# Patient Record
Sex: MALE | Race: WHITE | NOT HISPANIC OR LATINO | Employment: OTHER | URBAN - METROPOLITAN AREA
[De-identification: names, ages, dates, MRNs, and addresses within clinical notes are randomized per-mention and may not be internally consistent; named-entity substitution may affect disease eponyms.]

---

## 2017-05-20 PROBLEM — G47.33 OSA ON CPAP: Status: ACTIVE | Noted: 2017-05-20

## 2017-05-20 PROBLEM — E66.09 NON MORBID OBESITY DUE TO EXCESS CALORIES: Status: ACTIVE | Noted: 2017-05-20

## 2017-05-20 PROBLEM — N20.1 LEFT URETERAL CALCULUS: Status: ACTIVE | Noted: 2017-05-20

## 2017-05-20 PROBLEM — R00.1 BRADYCARDIA: Status: ACTIVE | Noted: 2017-05-20

## 2017-05-20 PROBLEM — E78.5 DYSLIPIDEMIA: Status: ACTIVE | Noted: 2017-05-20

## 2017-05-21 PROBLEM — N13.2 HYDRONEPHROSIS WITH OBSTRUCTING CALCULUS: Status: ACTIVE | Noted: 2017-05-21

## 2017-05-21 PROBLEM — R00.1 BRADYCARDIA, SINUS: Status: ACTIVE | Noted: 2017-05-21

## 2017-05-23 ENCOUNTER — TELEPHONE (OUTPATIENT)
Dept: CARDIOLOGY | Facility: CLINIC | Age: 72
End: 2017-05-23

## 2017-05-23 NOTE — TELEPHONE ENCOUNTER
----- Message from Rama Velez sent at 5/23/2017 12:01 PM CDT -----  Patient being discharged from [facility] and needs a 2 week follow up. Please call patient back at [phone] cell number is the best contact.     Thank you

## 2017-07-13 PROBLEM — R94.39 ABNORMAL STRESS TEST: Status: ACTIVE | Noted: 2017-07-13

## 2018-08-27 ENCOUNTER — TELEPHONE (OUTPATIENT)
Dept: RHEUMATOLOGY | Facility: CLINIC | Age: 73
End: 2018-08-27

## 2018-08-27 NOTE — TELEPHONE ENCOUNTER
----- Message from Patria Nesbitt sent at 8/27/2018 11:18 AM CDT -----  Contact: wife, Meliza Benitez  Patient needs to reschedule appointment for 08/28/18. Patient not able to make the appointment and would like to get in as soon as possible. Please call wife at [phone] or [phone]    Rescheduled for 8-30-18 at 1. Patient confirms. CG

## 2018-08-30 ENCOUNTER — LAB VISIT (OUTPATIENT)
Dept: LAB | Facility: HOSPITAL | Age: 73
End: 2018-08-30
Attending: INTERNAL MEDICINE
Payer: MEDICARE

## 2018-08-30 ENCOUNTER — INITIAL CONSULT (OUTPATIENT)
Dept: RHEUMATOLOGY | Facility: CLINIC | Age: 73
End: 2018-08-30
Payer: MEDICARE

## 2018-08-30 VITALS
BODY MASS INDEX: 32.42 KG/M2 | DIASTOLIC BLOOD PRESSURE: 78 MMHG | HEART RATE: 56 BPM | HEIGHT: 68 IN | WEIGHT: 213.94 LBS | SYSTOLIC BLOOD PRESSURE: 121 MMHG

## 2018-08-30 DIAGNOSIS — R53.83 MALAISE AND FATIGUE: ICD-10-CM

## 2018-08-30 DIAGNOSIS — R76.8 RHEUMATOID FACTOR POSITIVE: Primary | ICD-10-CM

## 2018-08-30 DIAGNOSIS — R76.8 RHEUMATOID FACTOR POSITIVE: ICD-10-CM

## 2018-08-30 DIAGNOSIS — R53.81 MALAISE AND FATIGUE: ICD-10-CM

## 2018-08-30 LAB
CCP AB SER IA-ACNC: <0.5 U/ML
CRP SERPL-MCNC: 1.1 MG/L
ERYTHROCYTE [SEDIMENTATION RATE] IN BLOOD BY WESTERGREN METHOD: 7 MM/HR

## 2018-08-30 PROCEDURE — 36415 COLL VENOUS BLD VENIPUNCTURE: CPT | Mod: PO

## 2018-08-30 PROCEDURE — 86140 C-REACTIVE PROTEIN: CPT

## 2018-08-30 PROCEDURE — 99999 PR PBB SHADOW E&M-EST. PATIENT-LVL III: CPT | Mod: PBBFAC,,, | Performed by: INTERNAL MEDICINE

## 2018-08-30 PROCEDURE — 80074 ACUTE HEPATITIS PANEL: CPT

## 2018-08-30 PROCEDURE — 99205 OFFICE O/P NEW HI 60 MIN: CPT | Mod: S$PBB,,, | Performed by: INTERNAL MEDICINE

## 2018-08-30 PROCEDURE — 86200 CCP ANTIBODY: CPT

## 2018-08-30 PROCEDURE — 99213 OFFICE O/P EST LOW 20 MIN: CPT | Mod: PBBFAC,PO | Performed by: INTERNAL MEDICINE

## 2018-08-30 PROCEDURE — 85651 RBC SED RATE NONAUTOMATED: CPT | Mod: PO

## 2018-08-30 RX ORDER — PREDNISOLONE ACETATE 10 MG/ML
SUSPENSION/ DROPS OPHTHALMIC
COMMUNITY
Start: 2018-08-22 | End: 2018-09-19

## 2018-08-30 RX ORDER — AZELASTINE HYDROCHLORIDE 0.5 MG/ML
SOLUTION/ DROPS OPHTHALMIC
COMMUNITY
Start: 2018-08-22 | End: 2018-09-19

## 2018-08-30 ASSESSMENT — ROUTINE ASSESSMENT OF PATIENT INDEX DATA (RAPID3)
PSYCHOLOGICAL DISTRESS SCORE: 0
TOTAL RAPID3 SCORE: .94
PATIENT GLOBAL ASSESSMENT SCORE: 1.5
MDHAQ FUNCTION SCORE: .1
PAIN SCORE: 1

## 2018-08-30 NOTE — PROGRESS NOTES
Subjective:          Chief Complaint: Bryan Elliott is a 73 y.o. male who had concerns including Rheumatoid Arthritis (referral from Dr. Cecil Stern).    HPI:  Patient is a 73-year-old gentleman is being referred by Dr. Stern for polyarthritis positive rheumatoid factor slightly elevated inflammatory marker.  He has t a history of hyperlipidemia, bradycardia hypertension add no history of renal disease says last GFR was greater than 60.  He follows with cardiology DrMadai.  Also some he has undergone left heart catheterization with minimal to no coronary artery disease and normal ejection fraction 24 hr Holter average heart rate was 65.  Did see Dr. Tyson with scleritis started this week. No recall that his eyes are dry but       Component      Latest Ref Rng & Units 4/4/2018   Sed Rate      0 - 19 mm/Hr 21 (H)   CHRISTO Screen      None Detected None Detected   Rheumatoid Factor      0.0 - 15.0 IU/mL 26.5 (H)     REVIEW OF SYSTEMS:    Review of Systems   Constitutional: Negative for fever, malaise/fatigue and weight loss.   HENT: Negative for sore throat.    Eyes: Negative for double vision, photophobia and redness.   Respiratory: Negative for cough, shortness of breath and wheezing.    Cardiovascular: Negative for chest pain, palpitations and orthopnea.   Gastrointestinal: Negative for abdominal pain, constipation and diarrhea.   Genitourinary: Negative for dysuria, hematuria and urgency.   Musculoskeletal: Negative for back pain, joint pain and myalgias.   Skin: Negative for rash.   Neurological: Negative for dizziness, tingling, focal weakness and headaches.   Endo/Heme/Allergies: Does not bruise/bleed easily.   Psychiatric/Behavioral: Negative for depression, hallucinations and suicidal ideas.               Objective:            Past Medical History:   Diagnosis Date    Bradycardia     Hyperlipidemia     Kidney stones     GISSEL on CPAP     uses cpap    Respiratory distress     had trouble getting oxygen level  up.     Family History   Problem Relation Age of Onset    Cancer Mother     Multiple myeloma Mother     Heart disease Father     Arthritis Brother     No Known Problems Maternal Grandfather     Stroke Paternal Grandfather      Social History     Tobacco Use    Smoking status: Never Smoker    Smokeless tobacco: Never Used   Substance Use Topics    Alcohol use: No    Drug use: No         Current Outpatient Medications on File Prior to Visit   Medication Sig Dispense Refill    aspirin (ECOTRIN) 81 MG EC tablet Take 81 mg by mouth once daily.      atorvastatin (LIPITOR) 10 MG tablet Take 1 tablet (10 mg total) by mouth once daily. 90 tablet 3    coenzyme Q10 (CO Q-10) 10 mg capsule Take 10 mg by mouth once daily.      cyanocobalamin (VITAMIN B-12) 1000 MCG tablet Take 100 mcg by mouth once daily.      ERGOCALCIFEROL, VITAMIN D2, (VITAMIN D ORAL) Take 1 capsule by mouth once daily.      tamsulosin (FLOMAX) 0.4 mg Cp24 Take 0.4 mg by mouth once daily.       No current facility-administered medications on file prior to visit.        There were no vitals filed for this visit.    Physical Exam:    Physical Exam   Constitutional: He appears well-developed and well-nourished.   HENT:   Head: Atraumatic.   Nose: No nasal deformity.   Mouth/Throat: No oral lesions. No dental caries.   Eyes: Pupils are equal, round, and reactive to light. Right conjunctiva is not injected. Left conjunctiva is not injected.   Neck: No JVD present.   Cardiovascular: Normal rate and regular rhythm.   Pulmonary/Chest: Effort normal and breath sounds normal.   Abdominal: Soft. Bowel sounds are normal. There is no hepatosplenomegaly.   Musculoskeletal:        Right shoulder: He exhibits normal range of motion, no tenderness, no effusion and no crepitus.        Left shoulder: He exhibits normal range of motion, no tenderness, no effusion and no crepitus.        Right elbow: He exhibits normal range of motion and no effusion. No  tenderness found.        Left elbow: He exhibits normal range of motion and no effusion. No tenderness found.        Right wrist: He exhibits normal range of motion, no tenderness and no swelling.        Left wrist: He exhibits normal range of motion, no tenderness and no swelling.        Right hip: He exhibits normal range of motion, no tenderness and no swelling.        Left hip: He exhibits normal range of motion, no tenderness and no swelling.        Right knee: He exhibits normal range of motion and no swelling. No tenderness found.        Left knee: He exhibits normal range of motion and no swelling. No tenderness found.        Right ankle: He exhibits normal range of motion and no swelling. No tenderness.        Left ankle: He exhibits normal range of motion and no swelling. No tenderness.   Lymphadenopathy:     He has no cervical adenopathy.   Neurological: He has normal strength.   Skin: Skin is warm, dry and intact.   Psychiatric: He has a normal mood and affect.             Assessment:       Encounter Diagnoses   Name Primary?    Rheumatoid factor positive Yes    Malaise and fatigue           Plan:        Rheumatoid factor positive  -     Hepatitis panel, acute; Future; Expected date: 08/30/2018  -     Sedimentation rate; Future; Expected date: 08/30/2018  -     C-reactive protein; Standing; Expected date: 08/30/2018  -     Cyclic citrul peptide antibody, IgG; Future; Expected date: 08/30/2018    Malaise and fatigue  -     Hepatitis panel, acute; Future; Expected date: 08/30/2018  -     Sedimentation rate; Future; Expected date: 08/30/2018  -     C-reactive protein; Standing; Expected date: 08/30/2018  -     Cyclic citrul peptide antibody, IgG; Future; Expected date: 08/30/2018    Very pleasant 72 y/o gentleman with +RF no clinical features of RA    -check serologies   -continue NSAIDs PRN    -fu PRN   -no overt synovitis / no joint pain.   Reviewed some exercises for piriformis syndrome  Follow-up if  symptoms worsen or fail to improve.          60min consultation with greater than 50% spent in counseling, chart review and coordination of care. All questions answered.  Thank you for allowing me to participate in the care of this very pleasant patient.

## 2018-08-30 NOTE — LETTER
August 30, 2018      Cecil Stern MD  [address] [address] [address]           [place] [facility] [address] [facility] [address] [place] [address]  Phone: [phone]  Fax: [phone]          Patient: Bryan Elliott   MR Number: [id number]   YOB: 1945   Date of Visit: 8/30/2018       Dear Dr. Cecil Stern:    Thank you for referring Bryan Elliott to me for evaluation. Attached you will find relevant portions of my assessment and plan of care.    If you have questions, please do not hesitate to call me. I look forward to following Bryan Elliott along with you.    Sincerely,    Tamiko Pichardo, DO    Enclosure  CC:  No Recipients    If you would like to receive this communication electronically, please contact [email or web address] or [phone] to request more information on [employer] access.    For providers and/or their staff who would like to refer a patient to [facility], please contact us through our one-stop-shop provider referral line, [facility], at [phone].    If you feel you have received this communication in error or would no longer like to receive these types of communications, please e-mail [email or web address]

## 2018-08-31 LAB
HAV IGM SERPL QL IA: NEGATIVE
HBV CORE IGM SERPL QL IA: NEGATIVE
HBV SURFACE AG SERPL QL IA: NEGATIVE
HCV AB SERPL QL IA: NEGATIVE

## 2018-09-13 ENCOUNTER — TELEPHONE (OUTPATIENT)
Dept: RHEUMATOLOGY | Facility: CLINIC | Age: 73
End: 2018-09-13

## 2018-09-13 NOTE — TELEPHONE ENCOUNTER
----- Message from Jess Amezquita sent at 9/13/2018  9:01 AM CDT -----  Type:  Test Results    Who Called:  Patient  Name of Test (Lab/Mammo/Etc):  lab  Date of Test:  8/30/18  Ordering Provider:  same  Where the test was performed:  [facility]  Best Call Back Number:  [phone]    Let patient know all labs were normal. DIXON

## 2018-11-06 PROBLEM — Z95.0 CARDIAC PACEMAKER IN SITU: Status: ACTIVE | Noted: 2018-11-06

## 2019-12-12 PROBLEM — R13.10 DYSPHAGIA, UNSPECIFIED: Status: ACTIVE | Noted: 2019-12-12

## 2020-10-14 PROBLEM — U07.1 PNEUMONIA DUE TO COVID-19 VIRUS: Status: ACTIVE | Noted: 2020-10-14

## 2020-10-14 PROBLEM — J12.82 PNEUMONIA DUE TO COVID-19 VIRUS: Status: ACTIVE | Noted: 2020-10-14

## 2020-10-17 PROBLEM — I48.91 ATRIAL FIBRILLATION WITH RAPID VENTRICULAR RESPONSE: Status: ACTIVE | Noted: 2020-10-17

## 2020-10-31 PROBLEM — K92.2 UGIB (UPPER GASTROINTESTINAL BLEED): Status: ACTIVE | Noted: 2020-10-31

## 2020-10-31 PROBLEM — D64.9 SYMPTOMATIC ANEMIA: Status: ACTIVE | Noted: 2020-10-31

## 2020-11-01 PROBLEM — K26.4 DUODENAL ULCER HEMORRHAGE: Status: ACTIVE | Noted: 2020-11-01

## 2020-11-10 ENCOUNTER — LAB VISIT (OUTPATIENT)
Dept: LAB | Facility: HOSPITAL | Age: 75
End: 2020-11-10
Attending: FAMILY MEDICINE
Payer: MEDICARE

## 2020-11-10 DIAGNOSIS — I10 ESSENTIAL HYPERTENSION, BENIGN: ICD-10-CM

## 2020-11-10 DIAGNOSIS — D64.9 ANEMIA, UNSPECIFIED: Primary | ICD-10-CM

## 2020-11-10 LAB
ANION GAP SERPL CALC-SCNC: 7 MMOL/L (ref 8–16)
BASOPHILS # BLD AUTO: 0.03 K/UL (ref 0–0.2)
BASOPHILS NFR BLD: 0.6 % (ref 0–1.9)
BUN SERPL-MCNC: 8 MG/DL (ref 8–23)
CALCIUM SERPL-MCNC: 8.7 MG/DL (ref 8.7–10.5)
CHLORIDE SERPL-SCNC: 107 MMOL/L (ref 95–110)
CO2 SERPL-SCNC: 27 MMOL/L (ref 23–29)
CREAT SERPL-MCNC: 1 MG/DL (ref 0.5–1.4)
DIFFERENTIAL METHOD: ABNORMAL
EOSINOPHIL # BLD AUTO: 0.2 K/UL (ref 0–0.5)
EOSINOPHIL NFR BLD: 4.8 % (ref 0–8)
ERYTHROCYTE [DISTWIDTH] IN BLOOD BY AUTOMATED COUNT: 16.2 % (ref 11.5–14.5)
EST. GFR  (AFRICAN AMERICAN): >60 ML/MIN/1.73 M^2
EST. GFR  (NON AFRICAN AMERICAN): >60 ML/MIN/1.73 M^2
GLUCOSE SERPL-MCNC: 104 MG/DL (ref 70–110)
HCT VFR BLD AUTO: 32.9 % (ref 40–54)
HGB BLD-MCNC: 10.2 G/DL (ref 14–18)
IMM GRANULOCYTES # BLD AUTO: 0.06 K/UL (ref 0–0.04)
IMM GRANULOCYTES NFR BLD AUTO: 1.3 % (ref 0–0.5)
LYMPHOCYTES # BLD AUTO: 1.3 K/UL (ref 1–4.8)
LYMPHOCYTES NFR BLD: 27.3 % (ref 18–48)
MCH RBC QN AUTO: 29.1 PG (ref 27–31)
MCHC RBC AUTO-ENTMCNC: 31 G/DL (ref 32–36)
MCV RBC AUTO: 94 FL (ref 82–98)
MONOCYTES # BLD AUTO: 0.5 K/UL (ref 0.3–1)
MONOCYTES NFR BLD: 9.7 % (ref 4–15)
NEUTROPHILS # BLD AUTO: 2.6 K/UL (ref 1.8–7.7)
NEUTROPHILS NFR BLD: 56.3 % (ref 38–73)
NRBC BLD-RTO: 0 /100 WBC
PLATELET # BLD AUTO: 351 K/UL (ref 150–350)
PMV BLD AUTO: 9.7 FL (ref 9.2–12.9)
POTASSIUM SERPL-SCNC: 4.1 MMOL/L (ref 3.5–5.1)
RBC # BLD AUTO: 3.51 M/UL (ref 4.6–6.2)
SODIUM SERPL-SCNC: 141 MMOL/L (ref 136–145)
WBC # BLD AUTO: 4.62 K/UL (ref 3.9–12.7)

## 2020-11-10 PROCEDURE — 85025 COMPLETE CBC W/AUTO DIFF WBC: CPT

## 2020-11-10 PROCEDURE — 80048 BASIC METABOLIC PNL TOTAL CA: CPT

## 2020-11-23 PROBLEM — I48.91 ATRIAL FIBRILLATION WITH RAPID VENTRICULAR RESPONSE: Status: RESOLVED | Noted: 2020-10-17 | Resolved: 2020-11-23

## 2020-11-23 PROBLEM — I48.0 PAF (PAROXYSMAL ATRIAL FIBRILLATION): Status: ACTIVE | Noted: 2020-11-23

## 2021-05-04 ENCOUNTER — PATIENT MESSAGE (OUTPATIENT)
Dept: RESEARCH | Facility: HOSPITAL | Age: 76
End: 2021-05-04

## 2021-06-03 PROBLEM — K26.0 ACUTE DUODENAL ULCER WITH HEMORRHAGE: Status: ACTIVE | Noted: 2021-06-03

## 2022-04-29 PROBLEM — E66.811 CLASS 1 OBESITY DUE TO EXCESS CALORIES WITH SERIOUS COMORBIDITY IN ADULT: Status: ACTIVE | Noted: 2022-04-29

## 2022-04-29 PROBLEM — E78.5 DYSLIPIDEMIA: Status: RESOLVED | Noted: 2017-05-20 | Resolved: 2022-04-29

## 2022-04-29 PROBLEM — K21.9 GASTROESOPHAGEAL REFLUX DISEASE WITHOUT ESOPHAGITIS: Status: ACTIVE | Noted: 2022-04-29

## 2022-04-29 PROBLEM — Z87.19 HISTORY OF INGUINAL HERNIA REPAIR, BILATERAL: Status: ACTIVE | Noted: 2022-04-29

## 2022-04-29 PROBLEM — K26.0 ACUTE DUODENAL ULCER WITH HEMORRHAGE: Status: RESOLVED | Noted: 2021-06-03 | Resolved: 2022-04-29

## 2022-04-29 PROBLEM — Z87.19 HISTORY OF DUODENAL ULCER: Status: ACTIVE | Noted: 2022-04-29

## 2022-04-29 PROBLEM — Z98.890 HISTORY OF HEMORRHOIDECTOMY: Status: ACTIVE | Noted: 2022-04-29

## 2022-04-29 PROBLEM — Z86.0100 PERSONAL HISTORY OF COLONIC POLYPS: Status: ACTIVE | Noted: 2022-04-29

## 2022-04-29 PROBLEM — R94.39 ABNORMAL STRESS TEST: Status: RESOLVED | Noted: 2017-07-13 | Resolved: 2022-04-29

## 2022-04-29 PROBLEM — N13.2 HYDRONEPHROSIS WITH OBSTRUCTING CALCULUS: Status: RESOLVED | Noted: 2017-05-21 | Resolved: 2022-04-29

## 2022-04-29 PROBLEM — G47.33 OSA ON CPAP: Status: RESOLVED | Noted: 2017-05-20 | Resolved: 2022-04-29

## 2022-04-29 PROBLEM — Z98.890 HISTORY OF ARTHROSCOPIC SURGERY OF SHOULDER: Status: ACTIVE | Noted: 2022-04-29

## 2022-04-29 PROBLEM — Z98.890 HISTORY OF INGUINAL HERNIA REPAIR, BILATERAL: Status: ACTIVE | Noted: 2022-04-29

## 2022-04-29 PROBLEM — I48.0 PAF (PAROXYSMAL ATRIAL FIBRILLATION): Status: RESOLVED | Noted: 2020-11-23 | Resolved: 2022-04-29

## 2022-04-29 PROBLEM — R13.10 DYSPHAGIA, UNSPECIFIED: Status: RESOLVED | Noted: 2019-12-12 | Resolved: 2022-04-29

## 2022-04-29 PROBLEM — U07.1 PNEUMONIA DUE TO COVID-19 VIRUS: Status: RESOLVED | Noted: 2020-10-14 | Resolved: 2022-04-29

## 2022-04-29 PROBLEM — J12.82 PNEUMONIA DUE TO COVID-19 VIRUS: Status: RESOLVED | Noted: 2020-10-14 | Resolved: 2022-04-29

## 2022-04-29 PROBLEM — Z86.79 HISTORY OF ATRIAL FIBRILLATION: Status: ACTIVE | Noted: 2022-04-29

## 2022-04-29 PROBLEM — R41.3 SHORT-TERM MEMORY LOSS: Status: ACTIVE | Noted: 2022-04-29

## 2022-04-29 PROBLEM — K29.30 CHRONIC SUPERFICIAL GASTRITIS WITHOUT BLEEDING: Status: ACTIVE | Noted: 2022-04-29

## 2022-04-29 PROBLEM — Z95.0 PRESENCE OF CARDIAC PACEMAKER: Status: ACTIVE | Noted: 2022-04-29

## 2022-04-29 PROBLEM — E66.09 NON MORBID OBESITY DUE TO EXCESS CALORIES: Status: RESOLVED | Noted: 2017-05-20 | Resolved: 2022-04-29

## 2022-04-29 PROBLEM — K26.4 DUODENAL ULCER HEMORRHAGE: Status: RESOLVED | Noted: 2020-11-01 | Resolved: 2022-04-29

## 2022-04-29 PROBLEM — K92.2 UGIB (UPPER GASTROINTESTINAL BLEED): Status: RESOLVED | Noted: 2020-10-31 | Resolved: 2022-04-29

## 2022-04-29 PROBLEM — Z86.19 HISTORY OF SHINGLES: Status: ACTIVE | Noted: 2022-04-29

## 2022-04-29 PROBLEM — Z80.7 FAMILY HISTORY OF MULTIPLE MYELOMA: Status: ACTIVE | Noted: 2022-04-29

## 2022-04-29 PROBLEM — D64.9 SYMPTOMATIC ANEMIA: Status: RESOLVED | Noted: 2020-10-31 | Resolved: 2022-04-29

## 2022-04-29 PROBLEM — E55.9 VITAMIN D DEFICIENCY, UNSPECIFIED: Status: ACTIVE | Noted: 2022-04-29

## 2022-04-29 PROBLEM — Z87.01 HISTORY OF VIRAL PNEUMONIA: Status: ACTIVE | Noted: 2022-04-29

## 2022-04-29 PROBLEM — N20.1 LEFT URETERAL CALCULUS: Status: RESOLVED | Noted: 2017-05-20 | Resolved: 2022-04-29

## 2022-04-29 PROBLEM — Z86.010 PERSONAL HISTORY OF COLONIC POLYPS: Status: ACTIVE | Noted: 2022-04-29

## 2022-04-29 PROBLEM — Z82.49 FAMILY HISTORY OF EARLY CAD: Status: ACTIVE | Noted: 2022-04-29

## 2022-04-29 PROBLEM — G47.33 OSA ON CPAP: Status: ACTIVE | Noted: 2022-04-29

## 2022-04-29 PROBLEM — E66.09 CLASS 1 OBESITY DUE TO EXCESS CALORIES WITH SERIOUS COMORBIDITY IN ADULT: Status: ACTIVE | Noted: 2022-04-29

## 2022-04-29 PROBLEM — I71.40 ABDOMINAL AORTIC ANEURYSM (AAA) WITHOUT RUPTURE: Status: ACTIVE | Noted: 2022-04-29

## 2022-04-29 PROBLEM — N40.0 BPH (BENIGN PROSTATIC HYPERPLASIA): Status: ACTIVE | Noted: 2022-04-29

## 2022-04-29 PROBLEM — N20.0 NEPHROLITHIASIS: Status: ACTIVE | Noted: 2022-04-29

## 2022-07-11 ENCOUNTER — TELEPHONE (OUTPATIENT)
Dept: NEUROLOGY | Facility: CLINIC | Age: 77
End: 2022-07-11

## 2022-07-11 ENCOUNTER — LAB VISIT (OUTPATIENT)
Dept: LAB | Facility: HOSPITAL | Age: 77
End: 2022-07-11
Attending: NURSE PRACTITIONER
Payer: MEDICARE

## 2022-07-11 ENCOUNTER — OFFICE VISIT (OUTPATIENT)
Dept: NEUROLOGY | Facility: CLINIC | Age: 77
End: 2022-07-11
Payer: MEDICARE

## 2022-07-11 VITALS
WEIGHT: 217.94 LBS | HEART RATE: 60 BPM | HEIGHT: 68 IN | SYSTOLIC BLOOD PRESSURE: 110 MMHG | RESPIRATION RATE: 16 BRPM | DIASTOLIC BLOOD PRESSURE: 75 MMHG | BODY MASS INDEX: 33.03 KG/M2

## 2022-07-11 DIAGNOSIS — R41.3 MEMORY LOSS: Primary | ICD-10-CM

## 2022-07-11 DIAGNOSIS — R41.3 OTHER AMNESIA: ICD-10-CM

## 2022-07-11 DIAGNOSIS — Z87.01 HISTORY OF VIRAL PNEUMONIA: ICD-10-CM

## 2022-07-11 DIAGNOSIS — R41.3 SHORT-TERM MEMORY LOSS: ICD-10-CM

## 2022-07-11 DIAGNOSIS — Z95.0 CARDIAC PACEMAKER IN SITU: ICD-10-CM

## 2022-07-11 DIAGNOSIS — R41.3 MEMORY LOSS: ICD-10-CM

## 2022-07-11 DIAGNOSIS — G47.33 OSA ON CPAP: ICD-10-CM

## 2022-07-11 DIAGNOSIS — Z95.0 PRESENCE OF CARDIAC PACEMAKER: ICD-10-CM

## 2022-07-11 DIAGNOSIS — Z86.79 HISTORY OF ATRIAL FIBRILLATION: ICD-10-CM

## 2022-07-11 LAB
ALBUMIN SERPL BCP-MCNC: 3.7 G/DL (ref 3.5–5.2)
ALP SERPL-CCNC: 61 U/L (ref 55–135)
ALT SERPL W/O P-5'-P-CCNC: 14 U/L (ref 10–44)
ANION GAP SERPL CALC-SCNC: 8 MMOL/L (ref 8–16)
AST SERPL-CCNC: 17 U/L (ref 10–40)
BASOPHILS # BLD AUTO: 0.04 K/UL (ref 0–0.2)
BASOPHILS NFR BLD: 0.9 % (ref 0–1.9)
BILIRUB SERPL-MCNC: 1.5 MG/DL (ref 0.1–1)
BUN SERPL-MCNC: 17 MG/DL (ref 8–23)
CALCIUM SERPL-MCNC: 9.2 MG/DL (ref 8.7–10.5)
CHLORIDE SERPL-SCNC: 106 MMOL/L (ref 95–110)
CO2 SERPL-SCNC: 25 MMOL/L (ref 23–29)
CREAT SERPL-MCNC: 1 MG/DL (ref 0.5–1.4)
DIFFERENTIAL METHOD: ABNORMAL
EOSINOPHIL # BLD AUTO: 0.1 K/UL (ref 0–0.5)
EOSINOPHIL NFR BLD: 2.7 % (ref 0–8)
ERYTHROCYTE [DISTWIDTH] IN BLOOD BY AUTOMATED COUNT: 13.5 % (ref 11.5–14.5)
EST. GFR  (AFRICAN AMERICAN): >60 ML/MIN/1.73 M^2
EST. GFR  (NON AFRICAN AMERICAN): >60 ML/MIN/1.73 M^2
FOLATE SERPL-MCNC: 9.5 NG/ML (ref 4–24)
GLUCOSE SERPL-MCNC: 87 MG/DL (ref 70–110)
HCT VFR BLD AUTO: 46.3 % (ref 40–54)
HGB BLD-MCNC: 14.9 G/DL (ref 14–18)
IMM GRANULOCYTES # BLD AUTO: 0.03 K/UL (ref 0–0.04)
IMM GRANULOCYTES NFR BLD AUTO: 0.7 % (ref 0–0.5)
LYMPHOCYTES # BLD AUTO: 1.4 K/UL (ref 1–4.8)
LYMPHOCYTES NFR BLD: 30.4 % (ref 18–48)
MCH RBC QN AUTO: 28.5 PG (ref 27–31)
MCHC RBC AUTO-ENTMCNC: 32.2 G/DL (ref 32–36)
MCV RBC AUTO: 89 FL (ref 82–98)
MONOCYTES # BLD AUTO: 0.5 K/UL (ref 0.3–1)
MONOCYTES NFR BLD: 11.6 % (ref 4–15)
NEUTROPHILS # BLD AUTO: 2.4 K/UL (ref 1.8–7.7)
NEUTROPHILS NFR BLD: 53.7 % (ref 38–73)
NRBC BLD-RTO: 0 /100 WBC
PLATELET # BLD AUTO: 209 K/UL (ref 150–450)
PMV BLD AUTO: 10.3 FL (ref 9.2–12.9)
POTASSIUM SERPL-SCNC: 4.1 MMOL/L (ref 3.5–5.1)
PROT SERPL-MCNC: 7.1 G/DL (ref 6–8.4)
RBC # BLD AUTO: 5.23 M/UL (ref 4.6–6.2)
SODIUM SERPL-SCNC: 139 MMOL/L (ref 136–145)
WBC # BLD AUTO: 4.48 K/UL (ref 3.9–12.7)

## 2022-07-11 PROCEDURE — 99215 OFFICE O/P EST HI 40 MIN: CPT | Mod: PBBFAC,PO | Performed by: NURSE PRACTITIONER

## 2022-07-11 PROCEDURE — 99999 PR PBB SHADOW E&M-EST. PATIENT-LVL V: CPT | Mod: PBBFAC,,, | Performed by: NURSE PRACTITIONER

## 2022-07-11 PROCEDURE — 82746 ASSAY OF FOLIC ACID SERUM: CPT | Performed by: NURSE PRACTITIONER

## 2022-07-11 PROCEDURE — 84425 ASSAY OF VITAMIN B-1: CPT | Performed by: NURSE PRACTITIONER

## 2022-07-11 PROCEDURE — 85025 COMPLETE CBC W/AUTO DIFF WBC: CPT | Performed by: NURSE PRACTITIONER

## 2022-07-11 PROCEDURE — 99205 OFFICE O/P NEW HI 60 MIN: CPT | Mod: S$PBB,,, | Performed by: NURSE PRACTITIONER

## 2022-07-11 PROCEDURE — 99999 PR PBB SHADOW E&M-EST. PATIENT-LVL V: ICD-10-PCS | Mod: PBBFAC,,, | Performed by: NURSE PRACTITIONER

## 2022-07-11 PROCEDURE — 80053 COMPREHEN METABOLIC PANEL: CPT | Performed by: NURSE PRACTITIONER

## 2022-07-11 PROCEDURE — 99205 PR OFFICE/OUTPT VISIT, NEW, LEVL V, 60-74 MIN: ICD-10-PCS | Mod: S$PBB,,, | Performed by: NURSE PRACTITIONER

## 2022-07-11 PROCEDURE — 86592 SYPHILIS TEST NON-TREP QUAL: CPT | Performed by: NURSE PRACTITIONER

## 2022-07-11 NOTE — PROGRESS NOTES
NEUROLOGY  Outpatient Consultation Visit     [facility]  [address] [facility] [address], [place] [address]  [phone] (office) / [phone] (fax)    Patient Name:  Bryan Elliott  :  1945  MR #:  [id number]  Acct #:  [id number]    Date of  Visit: 2022    Other Physicians:  Clinton Guerin MD (Primary Care Physician)      CHIEF COMPLAINT: Memory Loss      HISTORY OF PRESENT ILLNESS:  Bryan Elliott is a 77 y.o. L-handed male seen in consultation for memory loss per Clinton Guerin MD    Medical history is significant for GISSEL on CPAP, GERD, pAfib, bradycardia s/p PPM, BPH, shingles and COVID19 infection.     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     He feels that he hasn't ever had a great memory. He had COVID19 in  and noted overall worsening of his memory afterwards. Wife started to note some mild forgetfulness a few years after he retired.     He notes mainly ST memory loss. When he goes to the store, he forgets what he needs or the brand that he needs to get parts for his lawn . He forgets appointments. He asks repetitive questions or forgets what day it is sometimes. No language issues. He denies executive dysfunction. He doesn't always remember to take medications. He takes mostly vitamins, so he doesn't worry if he misses them some days.     He has dogs, cows and chickens that he still cares for on his property ([place] acres). He is independent with ADLs. Wife handles cooking, housework, finances. He drives without difficulty navigating.     No physical or gait changes. No recent falls. He is continent of urine.     He sleeps fairly well. He has GISSEL and is CPAP compliant. Sometimes, the mask irritates him during the night. He is established with Dr. Cox, who adjusted his CPAP settings about 1 year ago.     He denies anxiety or depression. He hasn't had any hallucinations.     His  father deveoped dementia late in life (90s).    He is a non smoker. He doesn't drink ETOH.     He has history of pAfib. He knows of at least 2 episodes. He was on Eliquis at one time, but stopped after a having a bleeding gastric ulcer. He has a PPM due to bradycardia.     Allergies:  Review of patient's allergies indicates:  No Known Allergies    Current Medications:  Current Outpatient Medications   Medication Sig Dispense Refill    ascorbic acid, vitamin C, (VITAMIN C) 500 MG tablet Take 500 mg by mouth once daily.      cholecalciferol, vitamin D3, (VITAMIN D3) 50 mcg (2,000 unit) Cap Take 1 capsule by mouth once daily.      cyanocobalamin (VITAMIN B-12) 1000 MCG tablet Take 1,000 mcg by mouth once daily.      tamsulosin (FLOMAX) 0.4 mg Cap Take 1 capsule (0.4 mg total) by mouth once daily. 90 capsule 3    zinc gluconate 50 mg tablet Take 50 mg by mouth once daily.      aspirin (ECOTRIN) 81 MG EC tablet Take 1 tablet (81 mg total) by mouth once daily. 90 tablet 3    co-enzyme Q-10 50 mg capsule Take 50 mg by mouth once daily.      ginkgo biloba 40 mg Tab Take 1 tablet by mouth Daily.      ibuprofen (ADVIL,MOTRIN) 800 MG tablet Take 1 tablet (800 mg total) by mouth 2 (two) times daily. (Patient not taking: Reported on 7/11/2022) 30 tablet 0    pantoprazole (PROTONIX) 40 MG tablet Take 1 tablet (40 mg total) by mouth once daily. (Patient not taking: Reported on 7/11/2022) 30 tablet 11    tadalafiL (CIALIS) 20 MG Tab Take 1 tablet (20 mg total) by mouth once daily. (Patient not taking: Reported on 7/11/2022) 90 tablet 3     No current facility-administered medications for this visit.       Past Medical History:  Past Medical History:   Diagnosis Date    a Abdominal Aortic 3 CM Infrarenal Aneurysm 04/29/2022 5/20/17 ABD/Pelvic CT Scan With Contrast = (See Report)    a Family H/O Early CAD     Dr. Jose Hemphill; On ASA 81 Mg Daily; 7/13/17 LHC/Angiogram = Normal Coronary Arteries (See Report); His Father  "   a H/O Paroxysmal Atrial Fibrillation     Dr. Jose Hemphill; On ASA 81 Mg Daily    a Permanent Pacemaker Present For Bradycardia     Dr. 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 Tung Newell; 4/29/22 RXd Protonix 20 Mg Daily    j Gastroesophageal Reflux Disease     Dr. Tung Newell    j H/O Bilateral Inguinal Hernia Repair     j H/O Duodenal Ulcer Cauterized In 2020     Dr. Tung Newell; With H/O UGIB From This    j H/O Hemorrhoidectomy Procedure     j H/O Hyperplastic Colon Polyps On 12/12/19 TC     Dr. Tung Newell: "Repeat TC In 5 Years"    k Benign Prostatic Hyperplasia     Dr. Gaurav Llanos; On Flomax 0.4 Mg Daily    k Erectile Dysfunction     Dr. Gaurav Llanos    k Nephrolithiasis     Dr. Gaurav Llanos; In 2017 Required Lithotripsy At [facility]    l H/O Left Shoulder Arthroscopic Surgery     m H/O Right Truncal Shingles     m Short Term Memory Loss     q Vitamin D Deficiency     On OTC D3 2K IU Daily       Past Surgical History:  Past Surgical History:   Procedure Laterality Date    A-V CARDIAC PACEMAKER INSERTION Left 10/4/2018    Procedure: INSERTION, CARDIAC PACEMAKER, DUAL CHAMBER;  Surgeon: Jose Hemphill III, MD;  Location[facility];  Service: Cardiology;  Laterality: Left;    COLONOSCOPY N/A 12/12/2019    Procedure: COLONOSCOPY;  Surgeon: Tung Newell MD;  Location: [facility] ENDO;  Service: Endoscopy;  Laterality: N/A;    ESOPHAGEAL DILATION N/A 12/12/2019    Procedure: DILATION, ESOPHAGUS;  Surgeon: Tung Newell MD;  Location: [facility] ENDO;  Service: Endoscopy;  Laterality: N/A;    ESOPHAGOGASTRODUODENOSCOPY N/A 12/12/2019    Procedure: EGD (ESOPHAGOGASTRODUODENOSCOPY);  Surgeon: Tung Newell MD;  Location[facility];  Service: Endoscopy;  Laterality: N/A;    ESOPHAGOGASTRODUODENOSCOPY " "N/A 11/1/2020    Procedure: EGD (ESOPHAGOGASTRODUODENOSCOPY);  Surgeon: Marques Fairchild MD;  Location: [facility];  Service: Endoscopy;  Laterality: N/A;    ESOPHAGOGASTRODUODENOSCOPY N/A 6/3/2021    Procedure: EGD (ESOPHAGOGASTRODUODENOSCOPY);  Surgeon: Tung Newell MD;  Location: [facility];  Service: Endoscopy;  Laterality: N/A;    HEMORRHOID SURGERY      HERNIA REPAIR Bilateral     Inguinal    LITHOTRIPSY      SHOULDER SURGERY Left     RCR       Family History:  family history includes Arthritis in his brother; Cancer in his mother; Heart disease in his father; Multiple myeloma in his mother; No Known Problems in his maternal grandfather; Stroke in his paternal grandfather.    Social History:   reports that he has never smoked. He has never used smokeless tobacco. He reports that he does not drink alcohol and does not use drugs.      REVIEW OF SYSTEMS:  As per HPI    PHYSICAL EXAM:  /75 (BP Location: Right arm, Patient Position: Sitting, BP Method: Large (Automatic))   Pulse 60   Resp 16   Ht 5' 8" (1.727 m)   Wt 98.9 kg (217 lb 14.8 oz)   BMI 33.14 kg/m²     General: Well groomed. No acute distress.  Pulmonary: Normal effort and rate.   Musculoskeletal: No obvious joint deformities, moves all extremities well.  Extremities: No clubbing, cyanosis or edema.     Neurological exam:  Mental status: Awake and alert.  Oriented to person, place, time and situation. Recent and remote memory appear to be largely intact. Fund of knowledge normal. Normal attention and concentration.   Speech/Language: Fluent and appropriate. No dysarthria or aphasia on conversation. Able to follow complex commands.   Cranial nerves (II-XII): Visual fields full. Pupils equal round and reactive to light, extraocular movements intact, no ptosis, no nystagmus. Facial sensation and symmetry intact bilaterally. Hearing grossly intact. Palate deferred Shoulder shrug normal bilaterally. Normal tongue protrusion.   Motor: 5 " out of 5 strength throughout the upper and lower extremities bilaterally. Normal bulk and tone. No abnormal movements noted. No drift appreciated.   Sensation: Intact to light touch and vibration bilaterally.   DTR: 2+ at the knees and biceps bilaterally.  Coordination: Finger-nose-finger testing intact bilaterally. MARCIO normal bilaterally. No tremor.   Gait: Normal gait, including tandem.     DIAGNOSTIC DATA:  I have personally reviewed provider notes, labs and imaging made available to me today.     Imaging:  N/a    Cardiac:  EKG 1/2022: Atrial paced     Labs:  CBC:   Lab Results   Component Value Date    WBC 6.02 12/29/2021    HGB 14.6 12/29/2021    HCT 46.0 12/29/2021     12/29/2021    MCV 88 12/29/2021    RDW 13.9 12/29/2021     BMP:   Lab Results   Component Value Date     12/29/2021    K 4.5 12/29/2021     12/29/2021    CO2 28 12/29/2021    BUN 18 12/29/2021    CREATININE 0.94 12/29/2021    GLU 91 12/29/2021    CALCIUM 9.6 12/29/2021    MG 2.4 10/19/2020    PHOS 3.4 10/19/2020     LFTS;   Lab Results   Component Value Date    PROT 6.6 12/29/2021    ALBUMIN 4.0 12/29/2021    BILITOT 1.2 12/29/2021    AST 32 12/29/2021    ALKPHOS 68 12/29/2021    ALT 23 12/29/2021     COAGS:   Lab Results   Component Value Date    INR 1.2 10/31/2020     FLP:   Lab Results   Component Value Date    CHOL 201 (H) 12/29/2021    HDL 54 12/29/2021    LDLCALC 128.8 12/29/2021    TRIG 91 12/29/2021    CHOLHDL 26.9 12/29/2021   No results found for: LABA1C, HGBA1C  Lab Results   Component Value Date    TSH 2.030 12/29/2021       ASSESSMENT & PLAN:  Bryan Elliott is a 77 y.o. L-handed male seen in consultation for memory loss.     Problem List Items Addressed This Visit        Neuro    Memory loss - Primary    Overview     MMSE:  27/30 July 2022           Current Assessment & Plan     Discussed normal aging / MCI / dementia at length with pt and wife.     Differential includes both neurodegenerative memory loss and  normal age-related memory loss. There could also be a degree of post-COVID19 memory loss. Cognitive testing is LLN today. Given his level of education, we will pursue further evaluation with MRI brain, NP testing and serologies. If his PPM is not MRI compatible, he can obtain the previously ordered CT brain. Otherwise, MRI is the superior test in this case.     No safety concerns today.               Pulmonary    H/O Pneumonia Due To COVID-19       Cardiac/Vascular    Cardiac pacemaker in situ    Permanent Pacemaker Present For Bradycardia    H/O Paroxysmal Atrial Fibrillation       Other    GISSEL on CPAP      Other Visit Diagnoses     Other amnesia              Follow up: ~ 4 months with me or MD after NP testing completed     I spent a total of 63 minutes on the day of the visit.    This includes face to face time with the patient, as well as non-face to face time preparing for and completing the visit (review of prior diagnostic testing and clinical notes, obtaining or reviewing history, documenting clinical information in the EMR, independently interpreting and communicating results to the patient/family and coordinating ongoing care).       I appreciate the opportunity to participate in the care of this patient. Please feel free to contact me with any concerns or questions.       Cyn Morgan, ACNPC-AG  [facility]  [address] [facility] [address]  [place] [address]

## 2022-07-11 NOTE — ASSESSMENT & PLAN NOTE
Discussed normal aging / MCI / dementia at length with pt and wife.     Differential includes both neurodegenerative memory loss and normal age-related memory loss. There could also be a degree of post-COVID19 memory loss. Cognitive testing is LLN today. Given his level of education, we will pursue further evaluation with MRI brain, NP testing and serologies. If his PPM is not MRI compatible, he can obtain the previously ordered CT brain. Otherwise, MRI is the superior test in this case.     No safety concerns today.

## 2022-07-11 NOTE — PATIENT INSTRUCTIONS
Your cognitive scores are within normal range today (lower range of normal).     We will obtain some basic labs to rule out any reversible causes for your memory loss, such as a nutritional deficiency or a thyroid problem. We will also obtain some baseline neuroimaging to look at the brain structure itself.     We will also do can consider doing the more in-depth cognitive assessment with our neuropyschologist in a few months based on any progression of your symptoms.     Certain medications have been shown to slow the clinical progression of MCI in some people. It is important to understand that these medications cannot reverse any pre-existing process. I do not think that we need to use any prescription medications at this point.     It is very important to work diligently to keep the blood vessels healthy to prevent any worsening. You should continue to work closely with your PCP to control your blood pressure, cholesterol and blood sugar. The Mediteranean diet is also recommended to promote overall vascular health.     While memory loss may not be reversible in many cases, we do know that there are several steps that you can take to prevent further memory loss:  Diet:  A Mediterranean style diet has been shown to be beneficial. This diet typically includes fresh fruits/vegetables, whole grains, fish and poultry. Foods, such as red meat, dairy and processed foods are avoided.   Research indicates certain nutrients may aid in brain function, such as B vitamins (especially B6, B12, and folic acid), antioxidants (such as vitamins C and E, and beta carotene), and Omega-3 fatty acids.  Minimize or eliminate the use of alcohol     Medications:  Discuss any prescription or over the counter medications you might be taking with your doctor to avoid those that can cause sedation or worsen cognitive function, such as sleep aids, benzodiazepines, and antihistamines.     Socialization and Exercise:  30-45 minutes of brisk  physical activity 5 days/week has been shown to improve function in vascular dementias, lower the risk of stroke and slow the progression of memory loss  Activities that are engaging or mentally stimulating, such as word puzzles, jigsaw puzzles and Sudoku, are also beneficial to cognitive health  Regular interaction with friends, family and community are also known to be helpful.     Sleep:  Establish a regular, consistent sleep pattern and practice good sleep hygiene.    Avoid screen time (computer, TV, smartphones or tablets) or heavy meals for at least an hour before bedtime.   Avoid caffeine or stimulants after 2 PM.   Exercise earlier in the day or mornings and keep your sleeping environment comfortable. Bedtime and wake-up times should be consistent every night and morning so the body becomes used to a single routine, even on the weekends.   Having a wind down routine (e.g., soft lights in the house, bath before bed, reduced fluid intake, songs, reading, less noise) also helps to promote sleep readiness.   Untreated obstructive sleep apnea can lead to an increased risk for stroke and further memory loss      STRATEGIES TO COPE WITH YOUR COGNITIVE DEFICITS:  Pay Attention!  Reduce distractions in the area  Look at the person speaking to you & paraphrase what they are saying  Write down important things  Ask them to repeat themselves if you zone out  Ask people to simplify or reduce information if you need to  Processing Speed  Using multiple methods to learn new information, such as listening, taking notes, and recording, can be helpful  Give yourself enough time to complete certain tasks to reduce frustration  Executive Functioning  Don't try to multi-task. Do tasks separately to ensure that each gets completed.   Use a calendar or planner to keep you on track  Write down steps to complicated tasks in case you forget  Storing Information  Immediately after you learn something, try to recall it. Repeat this  and gradually lengthen the interval between your recalls  Recalling information   Jog your memory! If you loose something, try to think back to when you last had it. Mentally walk yourself through the steps of your day to prod your memory.   Use clues, timers, memos, notes, etc.  Stay organized - keep your keys in a certain place, put your important papers in a certain place, etc.   Having a routine helps to anchor memories as well

## 2022-07-12 LAB — RPR SER QL: NORMAL

## 2022-07-15 LAB — VIT B1 BLD-MCNC: 66 UG/L (ref 38–122)

## 2022-08-08 PROBLEM — S16.1XXA CERVICAL STRAIN, ACUTE: Status: ACTIVE | Noted: 2022-08-08

## 2022-08-08 PROBLEM — S16.1XXA CERVICAL STRAIN, ACUTE: Status: RESOLVED | Noted: 2022-08-08 | Resolved: 2022-08-08

## 2022-08-23 PROBLEM — R00.1 BRADYCARDIA: Status: RESOLVED | Noted: 2017-05-20 | Resolved: 2022-08-23

## 2022-08-23 PROBLEM — Z95.0 PRESENCE OF CARDIAC PACEMAKER: Status: RESOLVED | Noted: 2022-04-29 | Resolved: 2022-08-23

## 2022-08-23 PROBLEM — I48.0 PAROXYSMAL ATRIAL FIBRILLATION: Status: ACTIVE | Noted: 2022-04-29

## 2022-08-23 PROBLEM — I71.43 ANEURYSM OF INFRARENAL ABDOMINAL AORTA: Status: ACTIVE | Noted: 2022-04-29

## 2022-09-11 PROBLEM — I71.40 ABDOMINAL AORTIC ANEURYSM (AAA) WITHOUT RUPTURE: Status: ACTIVE | Noted: 2022-09-11

## 2022-09-11 PROBLEM — I71.43 ANEURYSM OF INFRARENAL ABDOMINAL AORTA: Status: RESOLVED | Noted: 2022-04-29 | Resolved: 2022-09-11

## 2022-09-29 ENCOUNTER — TELEPHONE (OUTPATIENT)
Dept: NEUROLOGY | Facility: CLINIC | Age: 77
End: 2022-09-29
Payer: MEDICARE

## 2022-09-29 NOTE — TELEPHONE ENCOUNTER
Pt's wife notified that the office note from 7/11/22 and the lab work were faxed to Dr. Augustine Baires's office.

## 2022-09-29 NOTE — TELEPHONE ENCOUNTER
----- Message from Orlando Hinds sent at 9/29/2022  1:38 PM CDT -----  Regarding: test results  Type:  Test Results    Who Called:  wife // Sarah    Name of Test (Lab/Mammo/Etc):  Labs    Date of Test:  7/11/22    Ordering Provider:  PATTIE Morgan    Where the test was performed:  [facility], [facility]    Best Call Back Number:  [phone]    Additional Information: PT cannot see results in [facility]. pt has appt with Dr Augustine Baires at [facility] in [place]. Needs lab results and office notes from 7/11 visit faxed to Dr Baires directly at [phone]. Please call to assist.

## 2022-10-09 PROBLEM — E78.00 HYPERCHOLESTEROLEMIA: Status: ACTIVE | Noted: 2022-10-09

## 2022-12-13 ENCOUNTER — TELEPHONE (OUTPATIENT)
Dept: RHEUMATOLOGY | Facility: CLINIC | Age: 77
End: 2022-12-13
Payer: MEDICARE

## 2022-12-13 NOTE — TELEPHONE ENCOUNTER
----- Message from Patria Nesbitt sent at 12/12/2022  2:35 PM CST -----  Regarding: appointment  Contact: wife, Meliza Elliott  Type:  Sooner Appointment Request    Caller is requesting a sooner appointment.  Caller declined first available appointment listed below.  Caller will not accept being placed on the waitlist and is requesting a message be sent to doctor.    Name of Caller:  wife, Amy Elliott  When is the first available appointment?    Symptoms:  RA is high, having pain in hands  Best Call Back Number:  [phone]  Additional Information:  Patient saw doctor in 2018 and would like to see if Dr Pichardo would keep him has a patient. His levels have not been high until now. Please call wife to advise.Thanks!

## 2023-09-28 PROBLEM — Z01.810 ENCOUNTER FOR PRE-OPERATIVE CARDIOVASCULAR CLEARANCE: Status: ACTIVE | Noted: 2023-09-28

## 2023-09-28 PROBLEM — G56.02 LEFT CARPAL TUNNEL SYNDROME: Status: ACTIVE | Noted: 2023-09-28

## 2024-06-07 PROBLEM — Q21.12 PFO WITH ATRIAL SEPTAL ANEURYSM: Status: ACTIVE | Noted: 2024-06-07

## 2024-06-07 PROBLEM — I25.3 PFO WITH ATRIAL SEPTAL ANEURYSM: Status: ACTIVE | Noted: 2024-06-07

## 2024-06-20 PROBLEM — I48.91 ATRIAL FIBRILLATION WITH RVR: Status: ACTIVE | Noted: 2024-06-20

## 2024-06-20 PROBLEM — Z86.73 HISTORY OF CVA (CEREBROVASCULAR ACCIDENT): Status: ACTIVE | Noted: 2024-06-20

## 2024-06-20 PROBLEM — Z71.89 ACP (ADVANCE CARE PLANNING): Status: ACTIVE | Noted: 2024-06-20

## 2024-06-24 PROBLEM — Z01.810 ENCOUNTER FOR PRE-OPERATIVE CARDIOVASCULAR CLEARANCE: Status: RESOLVED | Noted: 2023-09-28 | Resolved: 2024-06-24

## 2024-06-24 PROBLEM — I48.91 ATRIAL FIBRILLATION WITH RVR: Status: RESOLVED | Noted: 2024-06-20 | Resolved: 2024-06-24

## 2024-06-24 PROBLEM — Z87.74 S/P PERCUTANEOUS PATENT FORAMEN OVALE CLOSURE: Status: ACTIVE | Noted: 2024-06-24

## 2025-02-04 DIAGNOSIS — M25.562 ACUTE PAIN OF LEFT KNEE: Primary | ICD-10-CM

## 2025-02-06 ENCOUNTER — OFFICE VISIT (OUTPATIENT)
Dept: ORTHOPEDICS | Facility: CLINIC | Age: 80
End: 2025-02-06
Payer: MEDICARE

## 2025-02-06 ENCOUNTER — HOSPITAL ENCOUNTER (OUTPATIENT)
Dept: RADIOLOGY | Facility: HOSPITAL | Age: 80
Discharge: HOME | End: 2025-02-06
Attending: ORTHOPAEDIC SURGERY
Payer: MEDICARE

## 2025-02-06 VITALS — HEIGHT: 68 IN | WEIGHT: 214.94 LBS | BODY MASS INDEX: 32.58 KG/M2

## 2025-02-06 DIAGNOSIS — M25.562 ACUTE PAIN OF LEFT KNEE: ICD-10-CM

## 2025-02-06 DIAGNOSIS — M17.12 PRIMARY OSTEOARTHRITIS OF LEFT KNEE: Primary | ICD-10-CM

## 2025-02-06 PROCEDURE — 20610 DRAIN/INJ JOINT/BURSA W/O US: CPT | Mod: PBBFAC,PO,LT | Performed by: ORTHOPAEDIC SURGERY

## 2025-02-06 PROCEDURE — 73560 X-RAY EXAM OF KNEE 1 OR 2: CPT | Mod: 26,59,RT, | Performed by: RADIOLOGY

## 2025-02-06 PROCEDURE — 73562 X-RAY EXAM OF KNEE 3: CPT | Mod: 26,LT,, | Performed by: RADIOLOGY

## 2025-02-06 PROCEDURE — 73562 X-RAY EXAM OF KNEE 3: CPT | Mod: TC,PO,LT

## 2025-02-06 PROCEDURE — 99213 OFFICE O/P EST LOW 20 MIN: CPT | Mod: PBBFAC,25,PO | Performed by: ORTHOPAEDIC SURGERY

## 2025-02-06 RX ADMIN — TRIAMCINOLONE ACETONIDE 40 MG: 40 INJECTION, SUSPENSION INTRA-ARTICULAR; INTRAMUSCULAR at 09:02

## 2025-02-18 RX ORDER — TRIAMCINOLONE ACETONIDE 40 MG/ML
40 INJECTION, SUSPENSION INTRA-ARTICULAR; INTRAMUSCULAR
Status: DISCONTINUED | OUTPATIENT
Start: 2025-02-06 | End: 2025-02-18 | Stop reason: HOSPADM

## 2025-02-18 NOTE — PROCEDURES
Large Joint Aspiration/Injection: L knee    Date/Time: 2/6/2025 9:00 AM    Performed by: Thang Nolasco MD  Authorized by: Thang Nolasco MD    Consent Done?:  Yes (Verbal)  Indications:  Pain  Timeout: prior to procedure the correct patient, procedure, and site was verified    Prep: patient was prepped and draped in usual sterile fashion    Local anesthetic:  Lidocaine 1% without epinephrine  Anesthetic total (ml):  5      Details:  Needle Size:  21 G  Approach:  Anterolateral  Location:  Knee  Site:  L knee  Medications:  40 mg triamcinolone acetonide 40 mg/mL  Patient tolerance:  Patient tolerated the procedure well with no immediate complications

## 2025-02-18 NOTE — PROGRESS NOTES
"Chief Complaint   Patient presents with    Left Knee - Pain, Injury, Swelling         HPI:   This is a 79 y.o. who presents to clinic today complaining of left knee pain for 5 years after no known trauma. Pain is progressively worsening. No numbness or tingling. No associated signs or symptoms.    Past Medical History:   Diagnosis Date    a Abdominal Aortic 3 CM Stable Infrarenal Aneurysm 09/11/2022    Will Repeat This In 2 Years; 9/8/22 AA U/S = Stable Infrarenal Aneurysm (See Report); 5/20/17 ABD/Pelvic CT Scan With Contrast = (See Report)    a Family H/O Early CAD     Dr. Jose Hemphill; On ASA 81 Mg Daily; 7/13/17 LHC/Angiogram = Normal Coronary Arteries (See Report); His Father    a H/O Paroxysmal Atrial Fibrillation     Dr. Jose Hemphill; On ASA 81 Mg Daily    a Permanent Pacemaker Present For Bradycardia     Dr. 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 Tung Newell; 4/29/22 RXd Protonix 20 Mg Daily    j Gastroesophageal Reflux Disease     Dr. Tung Newell    j H/O Bilateral Inguinal Hernia Repair     j H/O Duodenal Ulcer Cauterized In 2020     Dr. Tung Newell; With H/O UGIB From This    j H/O Hemorrhoidectomy Procedure     j H/O Hyperplastic Colon Polyps On 12/12/19 TC     Dr. Tung Newell: "Repeat TC In 5 Years"    k Benign Prostatic Hyperplasia     Dr. Gaurav Llanos; On Flomax 0.4 Mg Daily    k Erectile Dysfunction     Dr. Gaurav Llanos; 9/8/22 Testosterone = Normal    k Nephrolithiasis     Dr. Gaurav Llanos; In 2017 Required Lithotripsy At [facility]    l Acute Cervical Strain     08/08/2022    l H/O Left Shoulder Arthroscopic Surgery     m H/O Right Truncal Shingles     m Short Term Memory " Loss     9/8/22 TFTs, Vitamin B12, And Testosterone = Normal    q Vitamin D Deficiency     On OTC D3 2K IU Daily    Stroke     5/1/2024     Past Surgical History:   Procedure Laterality Date    A-V CARDIAC PACEMAKER INSERTION Left 10/04/2018    Procedure: INSERTION, CARDIAC PACEMAKER, DUAL CHAMBER;  Surgeon: Jose Hemphill III, MD;  Location: [facility] CATH;  Service: Cardiology;  Laterality: Left;    CARPAL TUNNEL RELEASE Left     CLOSURE OF LEFT ATRIAL APPENDAGE USING DEVICE  6/12/2024    Procedure: PFO closure ([place]);  Surgeon: Rory Woo MD;  Location: [facility] CATH;  Service: Cardiology;;    COLONOSCOPY N/A 12/12/2019    Procedure: COLONOSCOPY;  Surgeon: Tung Newell MD;  Location: [facility] ENDO;  Service: Endoscopy;  Laterality: N/A;    ESOPHAGEAL DILATION N/A 12/12/2019    Procedure: DILATION, ESOPHAGUS;  Surgeon: Tung Newell MD;  Location: [facility] ENDO;  Service: Endoscopy;  Laterality: N/A;    ESOPHAGOGASTRODUODENOSCOPY N/A 12/12/2019    Procedure: EGD (ESOPHAGOGASTRODUODENOSCOPY);  Surgeon: Tung Newell MD;  Location: [facility];  Service: Endoscopy;  Laterality: N/A;    ESOPHAGOGASTRODUODENOSCOPY N/A 11/01/2020    Procedure: EGD (ESOPHAGOGASTRODUODENOSCOPY);  Surgeon: Marques Fairchild MD;  Location: [facility] ENDO;  Service: Endoscopy;  Laterality: N/A;    ESOPHAGOGASTRODUODENOSCOPY N/A 06/03/2021    Procedure: EGD (ESOPHAGOGASTRODUODENOSCOPY);  Surgeon: Tung Newell MD;  Location: [facility] ENDO;  Service: Endoscopy;  Laterality: N/A;    HEMORRHOID SURGERY      HERNIA REPAIR Bilateral     Inguinal    INTRACARDIAC ECHOCARDIOGRAPHY  6/12/2024    Procedure: Intracardiac echocardiogram;  Surgeon: Rory Woo MD;  Location: [facility] CATH;  Service: Cardiology;;    LITHOTRIPSY      SHOULDER SURGERY Left     RCR     Medications Ordered Prior to Encounter[1]  Review of patient's allergies indicates:  No Known Allergies  Family History   Problem Relation Name Age of Onset    Cancer Mother      Multiple myeloma  Mother      Heart disease Father      Arthritis Brother      No Known Problems Maternal Grandfather      Stroke Paternal Grandfather       Social History[2]    Review of Systems:  Constitutional:  Denies fever or chills   Eyes:  Denies change in visual acuity   HENT:  Denies nasal congestion or sore throat   Respiratory:  Denies cough or shortness of breath   Cardiovascular:  Denies chest pain or edema   GI:  Denies abdominal pain, nausea, vomiting, bloody stools or diarrhea   :  Denies dysuria   Integument:  Denies rash   Neurologic:  Denies headache, focal weakness or sensory changes   Endocrine:  Denies polyuria or polydipsia   Lymphatic:  Denies swollen glands   Psychiatric:  Denies depression or anxiety     Physical Exam:   Constitutional:  Well developed, well nourished, no acute distress, non-toxic appearance   Integument:  Well hydrated, no rash   Lymphatic:  No lymphadenopathy noted   Neurologic:  Alert & oriented x 3, CN 2-12 normal, normal motor function, normal sensory function, no focal deficits noted   Psychiatric:  Speech and behavior appropriate   Eyes: EOMI  Gi: abdomen soft    Bilateral Knee Exam    left Knee Exam     Tenderness   The patient is experiencing tenderness in the medial joint line.    Range of Motion   Extension: abnormal   Flexion: abnormal     Muscle Strength     The patient has normal knee strength.    Tests   Jayson:  Medial - positive   Lachman:  Anterior - negative      Varus: negative  Valgus: negative  Patellar Apprehension: negative    Other   Erythema: absent  Sensation: normal  Pulse: present  Swelling: mild      right Knee Exam   right knee exam performed same as contralateral side and is normal.            X-rays were performed, personally reviewed by me and findings discussed with the patient.  3 views of the left knee show tricompartmental degenerative change most pronounced in the medial compartment with Kellgren 3 changes    Primary osteoarthritis of left knee  -      Prior authorization Order    Acute pain of left knee  -     Ambulatory referral/consult to Orthopedics            Using an aseptic technique, I injected 5 cc of lidocaine 1% without and 1 cc of kenalog 40mg into the left Knee. The patient tolerated this well. I will have them return to clinic in 2 months. PA gel.             [1]   Current Outpatient Medications on File Prior to Visit   Medication Sig Dispense Refill    aspirin (ECOTRIN) 81 MG EC tablet One tablet p.o. q.day with food 90 tablet 3    cholecalciferol, vitamin D3, (VITAMIN D3) 50 mcg (2,000 unit) Cap Take 1 capsule by mouth once daily.      co-enzyme Q-10 50 mg capsule Take 50 mg by mouth once daily.      diclofenac sodium (VOLTAREN ARTHRITIS PAIN) 1 % Gel Apply 2 g topically 3 (three) times daily. for 7 days 100 g 0    multivitamin (THERAGRAN) per tablet Take 1 tablet by mouth once daily.      pantoprazole (PROTONIX) 40 MG tablet TAKE 1 TABLET BY MOUTH DAILY FOR REFLUX 90 tablet 0    pravastatin (PRAVACHOL) 40 MG tablet Take 1 tablet (40 mg total) by mouth once daily. 90 tablet 3    tadalafiL (CIALIS) 20 MG Tab Take 1 tablet by mouth once daily.      tamsulosin (FLOMAX) 0.4 mg Cap Take 1 capsule (0.4 mg total) by mouth once daily. 90 capsule 3    zinc gluconate 50 mg tablet Take 50 mg by mouth once daily.       No current facility-administered medications on file prior to visit.   [2]   Social History  Socioeconomic History    Marital status:     Number of children: 3   Occupational History    Occupation: teacher   Tobacco Use    Smoking status: Never    Smokeless tobacco: Never   Substance and Sexual Activity    Alcohol use: No    Drug use: No    Sexual activity: Yes     Partners: Female     Social Drivers of Health     Financial Resource Strain: Patient Declined (5/1/2024)    Received from [facility]    Overall Financial Resource Strain (CARDIA)     Difficulty of Paying Living Expenses: Patient declined   Food Insecurity: No Food Insecurity  (5/1/2024)    Received from [facility]    Hunger Vital Sign     Worried About Running Out of Food in the Last Year: Never true     Ran Out of Food in the Last Year: Never true   Transportation Needs: No Transportation Needs (5/1/2024)    Received from [facility]    PRAPARE - Transportation     Lack of Transportation (Medical): No     Lack of Transportation (Non-Medical): No   Physical Activity: Patient Declined (5/1/2024)    Received from [facility]    Exercise Vital Sign     Days of Exercise per Week: Patient declined     Minutes of Exercise per Session: Patient declined   Stress: No Stress Concern Present (5/1/2024)    Received from [facility]    [employer] [place] [employer] - Occupational Stress Questionnaire     Feeling of Stress : Not at all

## 2025-05-24 ENCOUNTER — HOSPITAL ENCOUNTER (EMERGENCY)
Facility: HOSPITAL | Age: 80
Discharge: HOME | End: 2025-05-24
Attending: EMERGENCY MEDICINE
Payer: MEDICARE

## 2025-05-24 VITALS
OXYGEN SATURATION: 95 % | SYSTOLIC BLOOD PRESSURE: 115 MMHG | HEIGHT: 68 IN | TEMPERATURE: 99 F | RESPIRATION RATE: 18 BRPM | HEART RATE: 60 BPM | WEIGHT: 210 LBS | DIASTOLIC BLOOD PRESSURE: 62 MMHG | BODY MASS INDEX: 31.83 KG/M2

## 2025-05-24 DIAGNOSIS — L03.115 CELLULITIS OF RIGHT LOWER EXTREMITY: ICD-10-CM

## 2025-05-24 DIAGNOSIS — M25.571 ACUTE RIGHT ANKLE PAIN: Primary | ICD-10-CM

## 2025-05-24 DIAGNOSIS — R52 PAIN: ICD-10-CM

## 2025-05-24 LAB
ABSOLUTE EOSINOPHIL (OHS): 0.09 K/UL
ABSOLUTE MONOCYTE (OHS): 0.9 K/UL (ref 0.3–1)
ABSOLUTE NEUTROPHIL COUNT (OHS): 4.73 K/UL (ref 1.8–7.7)
ALBUMIN SERPL BCP-MCNC: 3.6 G/DL (ref 3.5–5.2)
ALP SERPL-CCNC: 64 UNIT/L (ref 40–150)
ALT SERPL W/O P-5'-P-CCNC: 12 UNIT/L (ref 10–44)
ANION GAP (OHS): 8 MMOL/L (ref 8–16)
AST SERPL-CCNC: 17 UNIT/L (ref 11–45)
BASOPHILS # BLD AUTO: 0.04 K/UL
BASOPHILS NFR BLD AUTO: 0.6 %
BILIRUB SERPL-MCNC: 1.1 MG/DL (ref 0.1–1)
BUN SERPL-MCNC: 15 MG/DL (ref 8–23)
CALCIUM SERPL-MCNC: 8.8 MG/DL (ref 8.7–10.5)
CHLORIDE SERPL-SCNC: 109 MMOL/L (ref 95–110)
CO2 SERPL-SCNC: 23 MMOL/L (ref 23–29)
CREAT SERPL-MCNC: 0.9 MG/DL (ref 0.5–1.4)
CRP SERPL-MCNC: 15.8 MG/L
ERYTHROCYTE [DISTWIDTH] IN BLOOD BY AUTOMATED COUNT: 13.9 % (ref 11.5–14.5)
GFR SERPLBLD CREATININE-BSD FMLA CKD-EPI: >60 ML/MIN/1.73/M2
GLUCOSE SERPL-MCNC: 101 MG/DL (ref 70–110)
HCT VFR BLD AUTO: 41.7 % (ref 40–54)
HGB BLD-MCNC: 13.9 GM/DL (ref 14–18)
IMM GRANULOCYTES # BLD AUTO: 0.02 K/UL (ref 0–0.04)
IMM GRANULOCYTES NFR BLD AUTO: 0.3 % (ref 0–0.5)
LYMPHOCYTES # BLD AUTO: 1.08 K/UL (ref 1–4.8)
MCH RBC QN AUTO: 28.7 PG (ref 27–31)
MCHC RBC AUTO-ENTMCNC: 33.3 G/DL (ref 32–36)
MCV RBC AUTO: 86 FL (ref 82–98)
NUCLEATED RBC (/100WBC) (OHS): 0 /100 WBC
PLATELET # BLD AUTO: 176 K/UL (ref 150–450)
PMV BLD AUTO: 9.6 FL (ref 9.2–12.9)
POTASSIUM SERPL-SCNC: 4.3 MMOL/L (ref 3.5–5.1)
PROT SERPL-MCNC: 6.7 GM/DL (ref 6–8.4)
RBC # BLD AUTO: 4.85 M/UL (ref 4.6–6.2)
RELATIVE EOSINOPHIL (OHS): 1.3 %
RELATIVE LYMPHOCYTE (OHS): 15.7 % (ref 18–48)
RELATIVE MONOCYTE (OHS): 13.1 % (ref 4–15)
RELATIVE NEUTROPHIL (OHS): 69 % (ref 38–73)
SODIUM SERPL-SCNC: 140 MMOL/L (ref 136–145)
URATE SERPL-MCNC: 4.6 MG/DL (ref 3.4–7)
WBC # BLD AUTO: 6.86 K/UL (ref 3.9–12.7)

## 2025-05-24 PROCEDURE — 80053 COMPREHEN METABOLIC PANEL: CPT

## 2025-05-24 PROCEDURE — 96374 THER/PROPH/DIAG INJ IV PUSH: CPT

## 2025-05-24 PROCEDURE — 63600175 PHARM REV CODE 636 W HCPCS: Mod: JZ,TB

## 2025-05-24 PROCEDURE — 84550 ASSAY OF BLOOD/URIC ACID: CPT

## 2025-05-24 PROCEDURE — 85025 COMPLETE CBC W/AUTO DIFF WBC: CPT

## 2025-05-24 PROCEDURE — 99284 EMERGENCY DEPT VISIT MOD MDM: CPT | Mod: 25

## 2025-05-24 PROCEDURE — 73610 X-RAY EXAM OF ANKLE: CPT | Mod: 26,LT,, | Performed by: RADIOLOGY

## 2025-05-24 PROCEDURE — 73610 X-RAY EXAM OF ANKLE: CPT | Mod: TC,LT

## 2025-05-24 PROCEDURE — 86140 C-REACTIVE PROTEIN: CPT

## 2025-05-24 RX ORDER — KETOROLAC TROMETHAMINE 30 MG/ML
15 INJECTION, SOLUTION INTRAMUSCULAR; INTRAVENOUS
Status: COMPLETED | OUTPATIENT
Start: 2025-05-24 | End: 2025-05-24

## 2025-05-24 RX ORDER — CEPHALEXIN 500 MG/1
500 CAPSULE ORAL 4 TIMES DAILY
Qty: 20 CAPSULE | Refills: 0 | Status: SHIPPED | OUTPATIENT
Start: 2025-05-24 | End: 2025-05-29

## 2025-05-24 RX ADMIN — KETOROLAC TROMETHAMINE 15 MG: 30 INJECTION, SOLUTION INTRAMUSCULAR; INTRAVENOUS at 01:05

## 2025-05-24 NOTE — ED PROVIDER NOTES
Encounter Date: 5/24/2025       History     Chief Complaint   Patient presents with    Foot Pain     Patient reports right foot/ankle pain x 3-4 days with increasing severity. Ankle is swollen. Pain increasing today. Uable to bear weight. Cannot recall an injury.      79-year-old male complaining of left ankle pain and swelling for 4 days without trauma.  Patient endorse increased pain with weight-bearing, ambulation, flexion, and extension.  Describes pain as sharp in nature.  Patient has taken Tylenol and gabapentin with mild relief.  He denies history of blood clots, or anticoagulation use, and paresthesias.    The history is provided by the patient. No  was used.     Review of patient's allergies indicates:  No Known Allergies  Past Medical History:   Diagnosis Date    a Abdominal Aortic 3 CM Stable Infrarenal Aneurysm 09/11/2022    Will Repeat This In 2 Years; 9/8/22 AA U/S = Stable Infrarenal Aneurysm (See Report); 5/20/17 ABD/Pelvic CT Scan With Contrast = (See Report)    a Family H/O Early CAD     Dr. Jose Hemphill; On ASA 81 Mg Daily; 7/13/17 LHC/Angiogram = Normal Coronary Arteries (See Report); His Father    a H/O Paroxysmal Atrial Fibrillation     Dr. Jose Hemphill; On ASA 81 Mg Daily    a Permanent Pacemaker Present For Bradycardia     Dr. 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 Tung Newell; 4/29/22 RXd Protonix 20 Mg Daily    j Gastroesophageal Reflux Disease     Dr. Tung Newell    j H/O Bilateral Inguinal Hernia Repair     j H/O Duodenal Ulcer Cauterized In 2020     Dr. Tung Newell; With H/O UGIB From This    j H/O Hemorrhoidectomy Procedure     j H/O  "Hyperplastic Colon Polyps On 12/12/19 TC     Dr. Tung Newell: "Repeat TC In 5 Years"    k Benign Prostatic Hyperplasia     Dr. Gaurav Llanos; On Flomax 0.4 Mg Daily    k Erectile Dysfunction     Dr. Gaurav Llanos; 9/8/22 Testosterone = Normal    k Nephrolithiasis     Dr. Gaurav Llanos; In 2017 Required Lithotripsy At [facility]    l Acute Cervical Strain     08/08/2022    l H/O Left Shoulder Arthroscopic Surgery     m H/O Right Truncal Shingles     m Short Term Memory Loss     9/8/22 TFTs, Vitamin B12, And Testosterone = Normal    q Vitamin D Deficiency     On OTC D3 2K IU Daily    Stroke     5/1/2024     Past Surgical History:   Procedure Laterality Date    A-V CARDIAC PACEMAKER INSERTION Left 10/04/2018    Procedure: INSERTION, CARDIAC PACEMAKER, DUAL CHAMBER;  Surgeon: Jose Hemphill III, MD;  Location: [facility];  Service: Cardiology;  Laterality: Left;    CARPAL TUNNEL RELEASE Left     CLOSURE OF LEFT ATRIAL APPENDAGE USING DEVICE  6/12/2024    Procedure: PFO closure ([place]);  Surgeon: Rory Woo MD;  Location: [facility];  Service: Cardiology;;    COLONOSCOPY N/A 12/12/2019    Procedure: COLONOSCOPY;  Surgeon: Tung Newell MD;  Location: [facility];  Service: Endoscopy;  Laterality: N/A;    ESOPHAGEAL DILATION N/A 12/12/2019    Procedure: DILATION, ESOPHAGUS;  Surgeon: Tung Newell MD;  Location: [facility];  Service: Endoscopy;  Laterality: N/A;    ESOPHAGOGASTRODUODENOSCOPY N/A 12/12/2019    Procedure: EGD (ESOPHAGOGASTRODUODENOSCOPY);  Surgeon: Tung Newell MD;  Location: [facility];  Service: Endoscopy;  Laterality: N/A;    ESOPHAGOGASTRODUODENOSCOPY N/A 11/01/2020    Procedure: EGD (ESOPHAGOGASTRODUODENOSCOPY);  Surgeon: Marques Fairchild MD;  Location: [facility];  Service: Endoscopy;  Laterality: N/A;    ESOPHAGOGASTRODUODENOSCOPY N/A 06/03/2021    Procedure: EGD (ESOPHAGOGASTRODUODENOSCOPY);  Surgeon: Tung Newell MD;  Location: [facility];  Service: Endoscopy;  Laterality: N/A;    " HEMORRHOID SURGERY      HERNIA REPAIR Bilateral     Inguinal    INTRACARDIAC ECHOCARDIOGRAPHY  6/12/2024    Procedure: Intracardiac echocardiogram;  Surgeon: Rory Woo MD;  Location: [facility];  Service: Cardiology;;    LITHOTRIPSY      SHOULDER SURGERY Left     RCR     Family History   Problem Relation Name Age of Onset    Cancer Mother      Multiple myeloma Mother      Heart disease Father      Arthritis Brother      No Known Problems Maternal Grandfather      Stroke Paternal Grandfather       Social History[1]  Review of Systems   Constitutional:  Negative for fever.   HENT:  Negative for sore throat.    Respiratory:  Negative for shortness of breath.    Cardiovascular:  Negative for chest pain.   Gastrointestinal:  Negative for nausea.   Genitourinary:  Negative for dysuria.   Musculoskeletal:  Positive for arthralgias and joint swelling. Negative for back pain.   Skin:  Negative for rash.   Neurological:  Negative for weakness and numbness.   Hematological:  Does not bruise/bleed easily.   All other systems reviewed and are negative.      Physical Exam     Initial Vitals [05/24/25 1249]   BP Pulse Resp Temp SpO2   115/62 60 18 98.5 °F (36.9 °C) 95 %      MAP       --         Physical Exam    Nursing note and vitals reviewed.  Constitutional: He appears well-developed and well-nourished. No distress.   HENT:   Head: Normocephalic and atraumatic.   Eyes: Pupils are equal, round, and reactive to light. No scleral icterus.   Neck: Neck supple.   Normal range of motion.  Cardiovascular:  Normal rate, regular rhythm, normal heart sounds and intact distal pulses.           Pacemaker palpable to chest wall   Pulmonary/Chest: Breath sounds normal. No respiratory distress.   Abdominal: Abdomen is soft. Bowel sounds are normal. There is no abdominal tenderness. There is no rebound and no guarding.   Musculoskeletal:         General: Tenderness and edema present.      Cervical back: Normal range of motion and neck  supple.      Comments: There is tenderness to the posterior aspect of the right foot near the calcaneus at the insertion of the Achilles tendon.  The lateral malleolar region is mildly edematous.  There is mild erythema, crepitus, or fluctuance.  Tissue compartments are soft and compressible.  Spear test is positive.  The extremity is neurovascularly intact.     Neurological: He is alert and oriented to person, place, and time. He has normal strength. No sensory deficit. GCS score is 15. GCS eye subscore is 4. GCS verbal subscore is 5. GCS motor subscore is 6.   Skin: Skin is warm and dry. Capillary refill takes less than 2 seconds. There is erythema.   Psychiatric: He has a normal mood and affect. Thought content normal.         ED Course   Procedures  Labs Reviewed   COMPREHENSIVE METABOLIC PANEL - Abnormal       Result Value    Sodium 140      Potassium 4.3      Chloride 109      CO2 23      Glucose 101      BUN 15      Creatinine 0.9      Calcium 8.8      Protein Total 6.7      Albumin 3.6      Bilirubin Total 1.1 (*)     ALP 64      AST 17      ALT 12      Anion Gap 8      eGFR >60     C-REACTIVE PROTEIN - Abnormal    CRP 15.8 (*)    CBC WITH DIFFERENTIAL - Abnormal    WBC 6.86      RBC 4.85      HGB 13.9 (*)     HCT 41.7      MCV 86      MCH 28.7      MCHC 33.3      RDW 13.9      Platelet Count 176      MPV 9.6      Nucleated RBC 0      Neut % 69.0      Lymph % 15.7 (*)     Mono % 13.1      Eos % 1.3      Basophil % 0.6      Imm Grans % 0.3      Neut # 4.73      Lymph # 1.08      Mono # 0.90      Eos # 0.09      Baso # 0.04      Imm Grans # 0.02     URIC ACID - Normal    Uric Acid 4.6     CBC W/ AUTO DIFFERENTIAL    Narrative:     The following orders were created for panel order Complete Blood Count (CBC).  Procedure                               Abnormality         Status                     ---------                               -----------         ------                     CBC with  Differential[[id number]]       Abnormal            Final result                 Please view results for these tests on the individual orders.          Imaging Results              X-Ray Ankle Complete Left (Final result)  Result time 05/24/25 13:56:44      Final result by Gabriella Avitia MD (05/24/25 13:56:44)                   Impression:      Soft tissue swelling about the ankle worse laterally.  No definite underlying acute osseous abnormality.      Electronically signed by: Gabriella Avitia  Date:    05/24/2025  Time:    13:56               Narrative:    EXAMINATION:  XR ANKLE COMPLETE 3 VIEW LEFT    CLINICAL HISTORY:  Pain, unspecified    TECHNIQUE:  AP, lateral and oblique views of the left ankle were performed.    COMPARISON:  Head    FINDINGS:  Talar dome is intact.  The ankle mortise is not widened.  Soft tissue swelling about the ankle worse laterally.  No acute, displaced fracture or aggressive osseous abnormality.  Superior calcaneal enthesophyte.  Well corticated ossific density adjacent to the anterior talus, likely remote avulsion injury.                                       Medications   ketorolac injection 15 mg (15 mg Intravenous Given 5/24/25 7119)     Medical Decision Making  Differential diagnosis includes but is not limited to: ankle sprain, fibular fracture, tibia fracture, calcaneus fracture, bi- or tri-malleolar fracture, septic joint, gout, arthritis, peroneal tendonitis, arterial insufficiency ulcer, neuropathy, others.    presents with initial presentation of mild local erythema, swelling to right ankle for 4 days.    Sensitivity/pain to light touch around the erythematous area.  No lymphangitic spread visible and no fluid pockets or fluctuance c/f abscess noted.  Low c/f osteomyelitis or DVT.  No immune compromise, bullae, pain out of proportion, or rapid progression c/f necrotizing fasciitis.    Rx: Cephalexin 500mg PO q6hrs    Disposition: No evidence of serious bacterial illness  requiring admission for IV antibiotics. Nontoxic appearing, VSS. Low risk for treatment failure based on history. Will discharge home with PO antibiotics and return precautions discussed at bedside.  Patient has previously established history [facility] his town a residence and advises that he will follow up with these providers.      Amount and/or Complexity of Data Reviewed  Labs: ordered. Decision-making details documented in ED Course.  Radiology: ordered. Decision-making details documented in ED Course.    Risk  Prescription drug management.               ED Course as of 05/25/25 0047   Sat May 24, 2025   1350 CBC without evidence of leukocytosis, acute blood loss anemia, or thrombocytopenia.  No left shift [NC]   1425 CRP(!): 15.8 [NC]   1425 Uric Acid: 4.6 [NC]   1426 X-Ray Ankle Complete Left  FINDINGS:  Talar dome is intact.  The ankle mortise is not widened.  Soft tissue swelling about the ankle worse laterally.  No acute, displaced fracture or aggressive osseous abnormality.  Superior calcaneal enthesophyte.  Well corticated ossific density adjacent to the anterior talus, likely remote avulsion injury.     Impression:     Soft tissue swelling about the ankle worse laterally.  No definite underlying acute osseous abnormality.      [NC]      ED Course User Index  [NC] Dominique Thomson NP                           Clinical Impression:  Final diagnoses:  [R52] Pain  [M25.571] Acute right ankle pain (Primary)  [L03.115] Cellulitis of right lower extremity          ED Disposition Condition    Discharge Stable          ED Prescriptions       Medication Sig Dispense Start Date End Date Auth. Provider    cephALEXin (KEFLEX) 500 MG capsule Take 1 capsule (500 mg total) by mouth 4 (four) times daily. for 5 days 20 capsule 5/24/2025 5/29/2025 Dominique Thomson NP          Follow-up Information       Follow up With Specialties Details Why Contact Info    Augustine Baires MD Internal  Medicine In 2 days  [address] [facility] [address]  [phone]      [facility] Emergency Dept Emergency Medicine  As needed, If symptoms worsen [address] [facility] [address] [phone]                   [1]   Social History  Tobacco Use    Smoking status: Never    Smokeless tobacco: Never   Substance Use Topics    Alcohol use: No    Drug use: No        Dominique Thomson NP  05/25/25 0049

## 2025-05-24 NOTE — ED TRIAGE NOTES
Patient reports severe rt ankle /foot pain x 3-4 day with increasing severity. Painful only on weight bearing . Denies injury.